# Patient Record
Sex: MALE | Race: WHITE
[De-identification: names, ages, dates, MRNs, and addresses within clinical notes are randomized per-mention and may not be internally consistent; named-entity substitution may affect disease eponyms.]

---

## 2018-07-18 ENCOUNTER — HOSPITAL ENCOUNTER (OUTPATIENT)
Dept: HOSPITAL 92 - LABBT | Age: 63
Discharge: HOME | End: 2018-07-18
Attending: SURGERY
Payer: COMMERCIAL

## 2018-07-18 DIAGNOSIS — Z01.818: Primary | ICD-10-CM

## 2018-07-18 DIAGNOSIS — K42.9: ICD-10-CM

## 2018-07-18 LAB
ANION GAP SERPL CALC-SCNC: 12 MMOL/L (ref 10–20)
BUN SERPL-MCNC: 17 MG/DL (ref 8.4–25.7)
CALCIUM SERPL-MCNC: 9.6 MG/DL (ref 7.8–10.44)
CHLORIDE SERPL-SCNC: 106 MMOL/L (ref 98–107)
CO2 SERPL-SCNC: 27 MMOL/L (ref 23–31)
CREAT CL PREDICTED SERPL C-G-VRATE: 0 ML/MIN (ref 70–130)
GLUCOSE SERPL-MCNC: 122 MG/DL (ref 80–115)
HGB BLD-MCNC: 14.9 G/DL (ref 14–18)
MCH RBC QN AUTO: 32.3 PG (ref 27–31)
MCV RBC AUTO: 94 FL (ref 78–98)
PLATELET # BLD AUTO: 171 THOU/UL (ref 130–400)
POTASSIUM SERPL-SCNC: 4.2 MMOL/L (ref 3.5–5.1)
RBC # BLD AUTO: 4.62 MILL/UL (ref 4.7–6.1)
SODIUM SERPL-SCNC: 141 MMOL/L (ref 136–145)
WBC # BLD AUTO: 5.8 THOU/UL (ref 4.8–10.8)

## 2018-07-18 PROCEDURE — 93005 ELECTROCARDIOGRAM TRACING: CPT

## 2018-07-18 PROCEDURE — 80048 BASIC METABOLIC PNL TOTAL CA: CPT

## 2018-07-18 PROCEDURE — 93010 ELECTROCARDIOGRAM REPORT: CPT

## 2018-07-18 PROCEDURE — 85027 COMPLETE CBC AUTOMATED: CPT

## 2018-07-19 ENCOUNTER — HOSPITAL ENCOUNTER (OUTPATIENT)
Dept: HOSPITAL 92 - SDC | Age: 63
Discharge: HOME | End: 2018-07-19
Attending: SURGERY
Payer: COMMERCIAL

## 2018-07-19 VITALS — BODY MASS INDEX: 25 KG/M2

## 2018-07-19 DIAGNOSIS — K42.9: Primary | ICD-10-CM

## 2018-07-19 DIAGNOSIS — Z88.0: ICD-10-CM

## 2018-07-19 DIAGNOSIS — Z87.891: ICD-10-CM

## 2018-07-19 DIAGNOSIS — Z79.899: ICD-10-CM

## 2018-07-19 PROCEDURE — 0WUF0JZ SUPPLEMENT ABDOMINAL WALL WITH SYNTHETIC SUBSTITUTE, OPEN APPROACH: ICD-10-PCS | Performed by: SURGERY

## 2018-07-20 NOTE — OP
DATE OF PROCEDURE:  07/19/2018

 

PREOPERATIVE DIAGNOSIS:  Umbilical hernia.

 

POSTOPERATIVE DIAGNOSIS:  Umbilical hernia.

 

PROCEDURE:  Umbilical hernia repair with mesh, Ventralex 4 cm.

 

SURGEON:  Dameon Matias M.D.

 

ESTIMATED BLOOD LOSS:  Minimal.

 

COMPLICATIONS:  None.

 

SPECIMEN:  None.

 

TECHNIQUE:  The patient was taken to the operating room, placed supine on the table.  After general a
nesthetic was obtained, the abdomen was shaved, prepped, and draped in a sterile fashion.  Curved inc
ision was made below the umbilicus.  Cautery was used to dissect down to and the umbilical stalk was 
amputated exposing umbilical defect.  The edges of the defect were freshened.  The preperitoneal spac
e was bluntly dissected through the defect.  The small Ventralex 4 cm mesh brought into the sterile f
ield, placed in the abdominal cavity through the defect.  The tails of the mesh were sewn to the late
ral walls using U stitch of Prolene and mesh was sewn superiorly and inferiorly using U stitch of Pro
serafin as well.  The tails were then cut at the level of the fascia.  The wound was irrigated.  The fas
bakari was closed loosely over the mesh.  The wound was irrigated.  Local anesthetic was applied.  The u
mbilical skin was tacked back down using 3-0 Vicryl, and the skin was closed using running 4-0 Monocr
yl and Dermabond.  The patient went to recovery in stable condition.  All instrument counts, needle c
ounts, lap counts were correct.